# Patient Record
Sex: FEMALE | Race: WHITE
[De-identification: names, ages, dates, MRNs, and addresses within clinical notes are randomized per-mention and may not be internally consistent; named-entity substitution may affect disease eponyms.]

---

## 2018-07-19 ENCOUNTER — HOSPITAL ENCOUNTER (EMERGENCY)
Dept: HOSPITAL 74 - JERFT | Age: 58
Discharge: HOME | End: 2018-07-19
Payer: MEDICARE

## 2018-07-19 VITALS — BODY MASS INDEX: 33.2 KG/M2

## 2018-07-19 VITALS — SYSTOLIC BLOOD PRESSURE: 144 MMHG | DIASTOLIC BLOOD PRESSURE: 81 MMHG | TEMPERATURE: 99 F | HEART RATE: 77 BPM

## 2018-07-19 DIAGNOSIS — M96.1: ICD-10-CM

## 2018-07-19 DIAGNOSIS — J44.9: ICD-10-CM

## 2018-07-19 DIAGNOSIS — I10: ICD-10-CM

## 2018-07-19 DIAGNOSIS — Z87.19: ICD-10-CM

## 2018-07-19 DIAGNOSIS — Z95.1: ICD-10-CM

## 2018-07-19 DIAGNOSIS — I25.10: ICD-10-CM

## 2018-07-19 DIAGNOSIS — J45.909: ICD-10-CM

## 2018-07-19 DIAGNOSIS — R10.32: Primary | ICD-10-CM

## 2018-07-19 DIAGNOSIS — E78.00: ICD-10-CM

## 2018-07-19 NOTE — PDOC
Rapid Medical Evaluation


Medical Evaluation: 


 Allergies











Allergy/AdvReac Type Severity Reaction Status Date / Time


 


No Known Allergies Allergy   Verified 06/18/18 21:51











07/19/18 19:20


I have performed a brief in-person evaluation of this patient. 


The patient presents with a chief complaint of: patient reports "pain to L groin

" with movement x "months", "i need an x-ray because i want to know if it's my 

hip,  both my sisters had hip replacements and all they needed was a hip x-ray.

"  has seen gyn, pain mgmt, but not ortho for groin pain


Pertinent physical exam findings: well appearing, ambulatory


I have ordered the following:  x-ray hip/pelvis


The patient will proceed to the ED for further evaluation.





**Discharge Disposition





- Diagnosis


 Groin pain, chronic, left








- Referrals





- Patient Instructions





- Post Discharge Activity

## 2018-07-19 NOTE — PDOC
History of Present Illness





- General


Chief Complaint: Pain


Stated Complaint: PAIN


Time Seen by Provider: 07/19/18 19:35


History Source: Patient


Exam Limitations: No Limitations





- History of Present Illness


Initial Comments: 





07/19/18 20:26


58-year-old female presents to the emergency room for evaluation of left groin 

pain for the past 6 months worsened with movement. Patient states has not had 

any imaging of the above listed receivers injection for her pain management 

doctor to joint with minimal improvement. Patient states takes Percocet on 

daily basis and states pain has not been relieved. Patient states has history 

of low back pain herniated disc with sciatica. Patient denies abdominal pain, 

urinary or bowel complaints. 


Timing/Duration: getting worse, intermittent


Severity: moderate


Associated Symptoms: reports: denies symptoms





Past History





- Travel


Traveled outside of the country in the last 30 days: No





- Past Medical History


Allergies/Adverse Reactions: 


 Allergies











Allergy/AdvReac Type Severity Reaction Status Date / Time


 


No Known Allergies Allergy   Verified 07/19/18 19:23











Home Medications: 


Ambulatory Orders





Aspirin Coated [Ecotrin -] 81 mg PO DAILY 01/11/13 


Clopidogrel Bisulfate [Plavix -] 75 mg PO DAILY #1 01/30/13 


Cyclobenzaprine HCl [Flexeril -] 10 mg PO TID PRN 10/14/15 


Lisinopril [Prinivil] 10 mg PO DAILY #30 tablet 02/12/16 


Metoprolol Tartrate [Lopressor -] 50 mg PO BID #60 tablet 02/12/16 


Clonazepam [Klonopin] 1 mg PO ASDIR 12/15/16 


Oxycodone HCl/Acetaminophen [Percocet  mg Tablet] 1 each PO QID PRN 12/15/

16 


Trazodone HCl 100 mg PO DAILY PRN 12/15/16 


Chlorthalidone 25 mg PO ASDIR 07/19/18 








Anemia: No


Asthma: Yes


Cancer: No


Cardiac Disorders: Yes (prolonged QT, CAD)


CVA: No


COPD: Yes


CHF: No


Dementia: No


Diabetes: No


GI Disorders: Yes (Irritable bowel syndrome, GERD)


 Disorders: No


HTN: Yes


Hypercholesterolemia: Yes


Liver Disease: No


Seizures: No


Thyroid Disease: No





- Surgical History


Abdominal Surgery: No


Appendectomy: No


Cardiac Surgery: Yes (bypass x 3 2010)


Cholecystectomy: No


Lung Surgery: No


Neurologic Surgery: No


Orthopedic Surgery: Yes (laminectomy 2004; jeff in rt ankle 2011)





- Family Disease History


Family Disease History: CA: Father, Brother, Sister





- Immunization History


Immunization Up to Date: No (states bad rx to flu shot last year)





- Suicide/Smoking/Psychosocial Hx


Smoking Status: Yes


Smoking History: Former smoker


Years of Tobacco Use: 20


Have you smoked in the past 12 months: Yes


Number of Cigarettes Smoked Daily: 20


Information on smoking cessation initiated: No


'Breaking Loose' booklet given: 01/22/16


Hx Alcohol Use: No


Drug/Substance Use Hx: No


Substance Use Type: Cocaine


Hx Substance Use Treatment: No


Patient Lives Alone: No


Lives with/in: parents





**Review of Systems





- Review of Systems


Able to Perform ROS?: No


Constitutional: No: Symptoms Reported


Musculoskeletal: Yes: Joint Pain, Muscle Pain


Integumentary: No: Symptoms Reported


Neurological: No: Symptoms reported


Endocrine: No: Symptoms Reported


Hematologic/Lymphatic: No: Symptoms Reported





*Physical Exam





- Vital Signs


 Last Vital Signs











Temp Pulse Resp BP Pulse Ox


 


 99.0 F   77   18   144/81   96 


 


 07/19/18 19:24  07/19/18 19:24  07/19/18 19:24  07/19/18 19:24  07/19/18 19:24














- Physical Exam


General Appearance: Yes: Nourished, Appropriately Dressed.  No: Apparent 

Distress


Vascular Pulses: Femoral (L): 2+


Gastrointestinal/Abdominal: positive: Soft.  negative: Tenderness


Extremity: positive: Normal Capillary Refill, Normal Inspection, Tender (Over 

left inguinal fold/ ligament).  negative: Normal Range of Motion (discomfort 

noted with abduction and straight leg raise of left lower extremity)


Integumentary: positive: Normal Color, Warm, Moist.  negative: Swelling, 

Ecchymosis


Neurologic: positive: Motor Strength 5/5 (ambulatory)





Medical Decision Making





- Medical Decision Making





07/19/18 20:09


Patient with left groin pain for the past 6 months worsened with movement. 

Patient states pain is unrelieved with Percocet which she has been prescribed 

on a daily basis for back pain. Patient exam had left inguinal tenderness with 

no adenopathy edema or increased warmth. Patient tender over the inguinal 

ligament. The pt received an x-ray from rapid medical evaluation. Patient 

ordered for Toradol here and  recommended to follow up with her pain management/

primary care doctor to receive a referral for MRI to  assess for damage of the 

ligament/tendon of the left groin.


07/19/18 20:31


Negative for acute findings except for mild degenerative joint disease.


Patient given copy of her x-ray on disc





*DC/Admit/Observation/Transfer


Diagnosis at time of Disposition: 


 Groin pain, chronic, left, Left inguinal pain








- Discharge Dispostion


Disposition: HOME


Condition at time of disposition: Good





- Referrals





- Patient Instructions


Printed Discharge Instructions:  DI for Ligament Sprains


Additional Instructions: 


At this time I recommend following up with her doctor or pain management to 

expedite an MRI.


Otherwise continue taking her Percocet avoid movements that trigger discomfort 

and may apply heating pad also for minor relief





- Post Discharge Activity

## 2019-03-22 PROBLEM — Z00.00 ENCOUNTER FOR PREVENTIVE HEALTH EXAMINATION: Status: ACTIVE | Noted: 2019-03-22

## 2019-03-27 ENCOUNTER — APPOINTMENT (OUTPATIENT)
Dept: PAIN MANAGEMENT | Facility: CLINIC | Age: 59
End: 2019-03-27
Payer: MEDICARE

## 2019-03-27 VITALS
SYSTOLIC BLOOD PRESSURE: 128 MMHG | DIASTOLIC BLOOD PRESSURE: 70 MMHG | BODY MASS INDEX: 34.75 KG/M2 | HEIGHT: 60 IN | WEIGHT: 177 LBS

## 2019-03-27 DIAGNOSIS — M54.16 RADICULOPATHY, LUMBAR REGION: ICD-10-CM

## 2019-03-27 DIAGNOSIS — M25.552 PAIN IN LEFT HIP: ICD-10-CM

## 2019-03-27 DIAGNOSIS — Z87.39 PERSONAL HISTORY OF OTHER DISEASES OF THE MUSCULOSKELETAL SYSTEM AND CONNECTIVE TISSUE: ICD-10-CM

## 2019-03-27 DIAGNOSIS — Z86.79 PERSONAL HISTORY OF OTHER DISEASES OF THE CIRCULATORY SYSTEM: ICD-10-CM

## 2019-03-27 DIAGNOSIS — F11.20 OPIOID DEPENDENCE, UNCOMPLICATED: ICD-10-CM

## 2019-03-27 PROCEDURE — 99204 OFFICE O/P NEW MOD 45 MIN: CPT

## 2019-03-28 PROBLEM — F11.20 OPIOID DEPENDENCE: Status: ACTIVE | Noted: 2019-03-28

## 2019-03-28 PROBLEM — M25.552 LEFT HIP PAIN: Status: ACTIVE | Noted: 2019-03-28

## 2019-03-28 PROBLEM — Z87.39 HISTORY OF BACK PAIN: Status: RESOLVED | Noted: 2019-03-28 | Resolved: 2019-03-28

## 2019-03-28 PROBLEM — M54.16 LUMBAR RADICULOPATHY, CHRONIC: Status: ACTIVE | Noted: 2019-03-28

## 2019-03-28 PROBLEM — Z86.79 HISTORY OF ESSENTIAL HYPERTENSION: Status: RESOLVED | Noted: 2019-03-28 | Resolved: 2019-03-28

## 2019-03-28 NOTE — REVIEW OF SYSTEMS
[Decrease Hearing] : decrease hearing [Joint Pain] : joint pain [Anxiety] : anxiety [Depression] : depression [Negative] : Heme/Lymph [FreeTextEntry2] : weight gain [FreeTextEntry3] : vision disturbance [FreeTextEntry5] : sob, chest pain

## 2019-03-28 NOTE — HISTORY OF PRESENT ILLNESS
[___ yrs] : [unfilled] year(s) ago [10] : a maximum pain level of 10/10 [Sharp] : sharp [Shooting] : shooting [Sitting] : sitting [Standing] : standing [Walking] : walking [Medications] : medications [FreeTextEntry1] : 59-year-old female presents with left hip pain. She also has low back pain which radiates down the posterior aspect of the left lower extremity. She reports that she had lumbar spine surgery in 2007 which was beneficial. Her pain in her low back has been present for over 40 years. She has not had a recent MRI of her lumbar spine. Her right leg is within normal limits. She is scheduled to have a left total hip replacement on May 7 of this year. She sees Dr. Shanks for interventions and medication management. Seeking second opinion. Pain is 10 out of 10 in intensity. Pain is described as sharp and shooting. Papers medication. The pain is worse activity. Quality of life is significantly impaired. No other recent changes in health.

## 2019-03-28 NOTE — ASSESSMENT
[FreeTextEntry1] : 59-year-old female presents with left hip pain and low back pain which radiates down the posterior aspect of the left lower extremity. She is scheduled to have a left total hip replacement on May 7 of this year. She sees Dr. Shanks for interventions and medication management. I would advise against opiate medications for long-term management of pain, and would advise the patient on a multimodal approach to her pain. Advised the patient she would likely benefit from an MRI of the lumbar spine. Advised the patient if she wishes further opinion I would be happy to see her to review imaging and plan for potential intervention. Would delay intervention to left her hip surgery. Patient advised that the administration of steroids will likely raise the blood pressure transiently, and to monitor this closely following any interventional procedure that involves steroids, given history of hypertension.\par

## 2019-03-28 NOTE — PHYSICAL EXAM
[de-identified] : Constitutional: Well-developed, in no acute distress\par Eyes: Pupil- Right: normal, Left: normal\par Neck exam: Inspection normal\par Respiratory: Normal effort, speaking in full sentences\par Cardiovascular: Regular rate and rhythm\par Vascular: Dorsal pedis pulses normal and equal bilaterally\par Abdomen: Inspection normal, no distension\par Skin: Inspection normal, no bruising noted\par Musculoskeletal:\par Lumbar Spine:   Gait: Antalgic\par 		Inspection: Normal curvature, no abnormal kyphosis or scoliosis\par 		Facet loading: pain bilaterally\par 		Palpation:\par 			Lumbar and paraspinal muscles: pain bilaterally\par 			Sacroiliac joint: no pain\par 			Greater trochanter: no pain\par 		Muscle Strength:\par 		Iliopsoas: 5/5 bilaterally\par 		Quadriceps: 5/5 bilaterally\par 		Hamstrings: 5/5 bilaterally\par 		Tibialis anterior: 5/5 bilaterally\par 		Extensor hallucis longus: 5/5 bilaterally\par \par 		Sensation: normal and equal in bilateral lower extremities\par \par 		Reflexes:\par 			Patellar reflex: 2+ bilaterally\par 			Ankle jerk reflex: 2+ bilaterally\par 		\par 		Straight leg raise: negative bilaterally\par \par Extremity: no edema noted\par Neurological: Memory normal, AAO x 3, Cranial nerves II - XII grossly normal\par Psychiatric: Appropriate mood and affect, oriented to time, place, person, and situation\par \par \par

## 2019-07-06 ENCOUNTER — HOSPITAL ENCOUNTER (EMERGENCY)
Dept: HOSPITAL 74 - JER | Age: 59
Discharge: HOME | End: 2019-07-06
Payer: MEDICARE

## 2019-07-06 VITALS — TEMPERATURE: 98.4 F

## 2019-07-06 VITALS — SYSTOLIC BLOOD PRESSURE: 100 MMHG | DIASTOLIC BLOOD PRESSURE: 60 MMHG | HEART RATE: 70 BPM

## 2019-07-06 VITALS — BODY MASS INDEX: 27.3 KG/M2

## 2019-07-06 DIAGNOSIS — J44.9: ICD-10-CM

## 2019-07-06 DIAGNOSIS — Z95.1: ICD-10-CM

## 2019-07-06 DIAGNOSIS — I25.2: ICD-10-CM

## 2019-07-06 DIAGNOSIS — K57.92: Primary | ICD-10-CM

## 2019-07-06 DIAGNOSIS — I25.10: ICD-10-CM

## 2019-07-06 DIAGNOSIS — I10: ICD-10-CM

## 2019-07-06 DIAGNOSIS — K21.9: ICD-10-CM

## 2019-07-06 LAB
ALBUMIN SERPL-MCNC: 3.6 G/DL (ref 3.4–5)
ALP SERPL-CCNC: 95 U/L (ref 45–117)
ALT SERPL-CCNC: 30 U/L (ref 13–61)
ANION GAP SERPL CALC-SCNC: 5 MMOL/L (ref 8–16)
APPEARANCE UR: CLEAR
AST SERPL-CCNC: 9 U/L (ref 15–37)
BASOPHILS # BLD: 0.6 % (ref 0–2)
BILIRUB SERPL-MCNC: 0.4 MG/DL (ref 0.2–1)
BILIRUB UR STRIP.AUTO-MCNC: NEGATIVE MG/DL
BUN SERPL-MCNC: 18.9 MG/DL (ref 7–18)
CALCIUM SERPL-MCNC: 8.9 MG/DL (ref 8.5–10.1)
CHLORIDE SERPL-SCNC: 102 MMOL/L (ref 98–107)
CO2 SERPL-SCNC: 31 MMOL/L (ref 21–32)
COLOR UR: (no result)
CREAT SERPL-MCNC: 0.8 MG/DL (ref 0.55–1.3)
DEPRECATED RDW RBC AUTO: 13.8 % (ref 11.6–15.6)
EOSINOPHIL # BLD: 0.9 % (ref 0–4.5)
GLUCOSE SERPL-MCNC: 99 MG/DL (ref 74–106)
HCT VFR BLD CALC: 43.9 % (ref 32.4–45.2)
HGB BLD-MCNC: 14.2 GM/DL (ref 10.7–15.3)
KETONES UR QL STRIP: (no result)
LEUKOCYTE ESTERASE UR QL STRIP.AUTO: NEGATIVE
LYMPHOCYTES # BLD: 10.4 % (ref 8–40)
MAGNESIUM SERPL-MCNC: 1.9 MG/DL (ref 1.8–2.4)
MCH RBC QN AUTO: 29.5 PG (ref 25.7–33.7)
MCHC RBC AUTO-ENTMCNC: 32.4 G/DL (ref 32–36)
MCV RBC: 91 FL (ref 80–96)
MONOCYTES # BLD AUTO: 11.3 % (ref 3.8–10.2)
NEUTROPHILS # BLD: 76.8 % (ref 42.8–82.8)
NITRITE UR QL STRIP: NEGATIVE
PH UR: 5 [PH] (ref 5–8)
PLATELET # BLD AUTO: 238 K/MM3 (ref 134–434)
PMV BLD: 9.6 FL (ref 7.5–11.1)
POTASSIUM SERPLBLD-SCNC: 3.9 MMOL/L (ref 3.5–5.1)
PROT SERPL-MCNC: 6.8 G/DL (ref 6.4–8.2)
PROT UR QL STRIP: NEGATIVE
PROT UR QL STRIP: NEGATIVE
RBC # BLD AUTO: 4.83 M/MM3 (ref 3.6–5.2)
SODIUM SERPL-SCNC: 138 MMOL/L (ref 136–145)
SP GR UR: 1.03 (ref 1.01–1.03)
UROBILINOGEN UR STRIP-MCNC: 1 MG/DL (ref 0.2–1)
WBC # BLD AUTO: 13.5 K/MM3 (ref 4–10)

## 2019-07-06 PROCEDURE — 3E033NZ INTRODUCTION OF ANALGESICS, HYPNOTICS, SEDATIVES INTO PERIPHERAL VEIN, PERCUTANEOUS APPROACH: ICD-10-PCS

## 2019-07-06 NOTE — PDOC
Documentation entered by Selma Duncan SCRIBE, acting as scribe for Marcellus Khalil MD.








Marcellus Khalil MD:  This documentation has been prepared by the [place] latif Collisia, SCRIBE, under my direction and personally reviewed by me in its 

entirety.  I confirm that the documentation accurately reflects all work, 

treatment, procedures, and medical decision making performed by me.  





Attending Attestation





- Resident


Resident Name: AliciaKarla





- ED Attending Attestation


I have performed the following: I have examined & evaluated the patient, The 

case was reviewed & discussed with the resident, I agree w/resident's findings 

& plan, Exceptions are as noted





- HPI


HPI: 





07/06/19 17:52


The patient is a 59 year old female with a significant past medical history of 

CAD, COPD, GERD, hypertension, hypercholesterolemia, diverticulitis, and 3 

vessel CABG who presents to the emergency department with 4 days of 

constipation and left sided abdominal pain. Symptoms are associated with nausea 

with 2 episodes of vomiting with heartburn over the last week. She states that 

she has been passing gas but has not had a BM in 4 days. She states this feels 

like her previous diverticulitis for which she was admitted to [facility] on IV abx a few years ago. 





Denies fevers, chills, cp, sob, LE edema, ha, focal weakness/numbness.





- Physicial Exam


PE: 





07/06/19 19:06


agree with resident exam





- Medical Decision Making





07/06/19 19:07


58yo F presents to the ED with L sided abd pain a/w nausea, vomiting, 

constipation.


Vitals wnl


Exam with LLQ ttp


DDx includes diverticulitis vs colitis vs constipation vs appendicitis vs 

gastritis


Plan:


labs


EKG (given nausea,vomiting) 


UA


CTAP


reassess





07/06/19 21:07


CTAP with uncomplicated diverticulitis


Pt continues to be well appearing, tolerating PO, non toxic


Pt given cipro/flagyl PO first dose here


Prescription sent to [facility], pt to  upon DC


Pt also had lung nodules on CT that must be followed up with outpt CT


Results communicated with pt, including improtance of f/u on these nodules to 

make sure they are not cancerous. Pt is active smoker and thus higher risk


Pt expresses understanding, will take report to PMD 


Strict return precautions discussed





I discussed the physical exam findings, ancillary test results and final 

diagnoses with the patient. I answered all of the patient's questions. The 

patient was satisfied with the care received and felt comfortable with the 

discharge plan and treatment plan. The patient will call their primary care 

physician within 24 hours to arrange follow-up and will return to the Emergency 

Department with any new, persistent or worsening symptoms. 








**Heart Score/ECG Review


  ** #1





07/06/19 21:09


Twelve-lead EKG was performed and reviewed by me. Normal sinus rhythm, rate 85. 

Normal axis and intervals. No ST elevations.Diffuse T-wave changes however no 

significant change compared to EKG from June 2018.

## 2019-07-06 NOTE — PDOC
History of Present Illness





- General


Chief Complaint: Pain


Stated Complaint: ABD PAIN


Time Seen by Provider: 07/06/19 17:20





- History of Present Illness


Initial Comments: 


Cristal Altamirano is a 58yo woman with a PMH of CAD s/p MI s/p 3vCABG, COPD, 

current smoker, h/o diverticulitis, IBS who presents stating that she is "here 

for diverticulitis." She states that she has had left-sided abdominal pain for 

several days along with constipation, which is very unusual for her. She has 

not had a BM in 3 days though she usually has 2-3 per day. She is still passing 

flatus. She has been able to tolerate food but has not had much of an appetite 

and has not tried to eat much. She has had frequent nausea. Ms Altamirano also 

reports two episodes over the past week in which she felt nauseated, had mid-

sternal burning that felt "like heartburn" followed by vomiting. The burning 

pain subsided immediately after vomiting. Currently she has nausea along with 

the abdominal pain. She did not take any medication at home for the pain as she 

did not want to "mask" the symptoms prior to presenting to the ED. She does 

usually take Percocet for arthritis pain in her hip and back, but she has not 

taken any recently. 








Past History





- Past Medical History


Allergies/Adverse Reactions: 


 Allergies











Allergy/AdvReac Type Severity Reaction Status Date / Time


 


No Known Allergies Allergy   Verified 07/06/19 16:33











Home Medications: 


Ambulatory Orders





Aspirin Coated [Ecotrin -] 81 mg PO DAILY 01/11/13 


Clopidogrel Bisulfate [Plavix -] 75 mg PO DAILY #1 01/30/13 


Lisinopril [Prinivil] 10 mg PO DAILY #30 tablet 02/12/16 


Metoprolol Tartrate [Lopressor -] 50 mg PO BID #60 tablet 02/12/16 


Clonazepam [Klonopin] 1 mg PO ASDIR 12/15/16 


Oxycodone HCl/Acetaminophen [Percocet  mg Tablet] 1 each PO QID PRN 12/15/

16 


Chlorthalidone 25 mg PO ASDIR 07/19/18 


Ciprofloxacin [Cipro -] 500 mg PO Q12H #20 tablet 07/06/19 


Famotidine [Pepcid] 20 mg PO ASDIR #30 tablet 07/06/19 


Metronidazole 500 mg PO Q8H #30 tablet 07/06/19 








Anemia: No


Asthma: Yes


Cancer: No


Cardiac Disorders: Yes (prolonged QT, CAD)


CVA: No


COPD: Yes


CHF: No


Dementia: No


Diabetes: No


GI Disorders: Yes (Irritable bowel syndrome, GERD)


 Disorders: No


HTN: Yes


Hypercholesterolemia: Yes


Liver Disease: No


Seizures: No


Thyroid Disease: No





- Surgical History


Abdominal Surgery: No


Appendectomy: No


Cardiac Surgery: Yes (bypass x 3 2010)


Cholecystectomy: No


Lung Surgery: No


Neurologic Surgery: No


Orthopedic Surgery: Yes (laminectomy 2004; jeff in rt ankle 2011)





- Family Disease History


Family Disease History: CA: Father, Brother, Sister





- Immunization History


Immunization Up to Date: No (states bad rx to flu shot last year)





- Suicide/Smoking/Psychosocial Hx


Smoking Status: Yes


Smoking History: Current every day smoker


Years of Tobacco Use: 20


Have you smoked in the past 12 months: Yes


Number of Cigarettes Smoked Daily: 20


Information on smoking cessation initiated: No


'Breaking Loose' booklet given: 01/22/16


Hx Alcohol Use: No


Drug/Substance Use Hx: No


Substance Use Type: Cocaine


Hx Substance Use Treatment: No





**Review of Systems





- Review of Systems


Comments:: 


General: No fevers, no chills, no weight or appetite change, no malaise


HEENT: No changes in vision, no changes in hearing, no congestion, no sore 

throat


CV: No chest pain, no palpitations, no LE edema


Pulm: No SOB, no cough, no wheezing


GI: See HPI


: No frequency, no urgency, no dysuria


Musc: No back pain, no joint swelling, no recent injury


Skin: No rash, no lesions, no erythema


Endo: No excessive thirst, no heat/cold intolerance


Heme: No unusual bruising or bleeding, no swollen glands


Neuro: No syncope, no numbness/tingling, no focal weakness


Vasc: No claudication


Psych: No recent change in mood, no SI or HI











*Physical Exam





- Vital Signs


 Last Vital Signs











Temp Pulse Resp BP Pulse Ox


 


 98.4 F   72   18   101/61   99 


 


 07/06/19 16:31  07/06/19 16:31  07/06/19 16:31  07/06/19 16:31  07/06/19 16:31














- Physical Exam


Comments: 


General: In no acute distress


HEENT: PERRL, EOMI, MMM, voice normal, normal neck ROM 


Cards: RRR, no murmur appreciated


Pulm: Comfortable on room air, clear to auscultation bilaterally


Abd: Soft, TTP in LLQ. Mildly distended.


Ext: Atraumatic. No LE edema. ROM intact 


Vasc: Extremities WWP 


Skin: Normal color, no rashes or lesions


Neuro: A&Ox3, CN grossly intact, normal speech, motor/sensory grossly intact 

and symmetric


Psych: Mood appropriate to situation 











ED Treatment Course





- LABORATORY


CBC & Chemistry Diagram: 


 07/06/19 17:28





 07/06/19 17:28





- RADIOLOGY


Radiology Studies Ordered: 














 Category Date Time Status


 


 ABDOMEN & PELVIS CT WITH CONTR [CT] Stat CT Scan  07/06/19 17:12 Ordered


 


 CHEST PA & LAT [RAD] Stat Radiology  07/06/19 17:12 Ordered














Medical Decision Making





- Medical Decision Making





07/06/19 17:29


Cristal Altamirano is a 58yo woman with a PMH of CAD s/p MI s/p 3vCABG, COPD, 

current smoker, h/o diverticulitis, IBS who presents with 3-4 days of left 

abdominal pain, constipation, nausea, and 2x episodes of vomiting.





- Ddx includes diverticulitis, gastroenteritis, IBS flare, constipation. Less 

likely appendicitis, simple gastritis, pancreatitis, cholecystitis or other 

intra-abdominal pathology given locatin of pain. No urinary symptoms suggesting 

UTI


- CBC, CMP, UA, CT abd/pelvis, CXR, EKG, trop


- IVF, famotidine, zofran, acetaminophen





07/06/19 18:17


- Labs notable for leukocytosis to 13. No other concerning abnormalities


- UA negative


- Re-assessed. States she feels somewhat improved. Updated w/ results





07/06/19 19:46


- CT completed, sent to imaging on call for read


- CXR without focal consolidation or other pathology. Sternal wires visualized, 

large cardiac silhouette 





07/06/19 21:01


- CT indicates diverticulitis


- Pt updated. Feels ready to go home


- Advised regarding liquid diet, pain control, follow up, return precautions. 

Ms Altamirano states understanding of the plan. Will give contact information for 

GI follow up.


- Will give first dose of abx prior to discharge








Discussed with Dr Khalil.





Karla Vargas


PGY2





*DC/Admit/Observation/Transfer


Diagnosis at time of Disposition: 


 Diverticulitis








- Discharge Dispostion


Disposition: HOME


Condition at time of disposition: Stable


Decision to Admit order: No





- Prescriptions


Prescriptions: 


Ciprofloxacin [Cipro -] 500 mg PO Q12H #20 tablet


Famotidine [Pepcid] 20 mg PO ASDIR #30 tablet


Metronidazole 500 mg PO Q8H #30 tablet





- Referrals


Referrals: 


Donte Rolle MD [Primary Care Provider] - 


Ariel Souza MD [Staff Physician] - 





- Patient Instructions


Printed Discharge Instructions:  DI for Diverticulitis


Additional Instructions: 


Discharge Instructions:


You were seen in the emergency department for abdominal pain. Your CT scan 

shows that you have diverticulitis, which is an infection of the colon. 


Diverticulosis is the presence of pouches on the colon. Diverticulitis is an 

infection of these pouches. 


Your CT scan also showed that you have several nodules in the lower left lung. 

It is impossible to determine what these nodules are without additional 

information. It is strongly recommended that you follow up with your regular 

pulmonologist regarding the nodules as you may require additional testing. 





Home Care and Follow Up:


- Stick to a liquid diet for the next 48-72 hours. This includes water, sports 

drinks, soup, jello, oatmeal, etc.


- You have been prescribed several antibiotics for the diverticulitis. These 

should be taken as directed for 10 days. One is called ciprofloxacin, which is 

taken every 12 hours (e.g. 6am and 6pm). The other is metronidazole (Flagyl) 

which is taken every 8 hours (e.g. 6am, 2pm, 10pm). 


- Do not drink ANY alcohol while taking metronidazole as this will make you 

feel very sick


- You have also been prescribed famotidine (Pepcid) for nausea and heartburn. 

This can be taken once or twice per day while you are sick, and you may wish to 

take it daily once you are feeling better. It is recommended that you take 

Pepcid 30 minutes before other food or medications. 


- You may use over the counter medications as needed for pain at home. 650mg 

acetaminophen (Tylenol) or 600mg ibuprofen (Motrin or Advil) can be used every 6

-8 hours. If needed for continued pain, these medications may be alternated 

every 3-4 hours. For example, if you take ibuprofen at 9am, you may take 

acetaminophen at noon, ibuprofen at 3pm, etc. 


- Do not take more than 4000mg (4g) of acetaminophen total per 24 hours. Be 

aware that your home Percocet includes acetaminophen. 


- It is strongly recommended that you take ibuprofen with food to help prevent 

stomach irritation. 


- Please make an appointment to see a gastroenterologist for follow up. You 

have been given contact information for Dr Souza, but you may see any GI 

doctor that you prefer. You should be seen within 1-2 weeks of completing your 

antibiotics.


- Make an appointment to see your regular doctor within the next week for 

follow up.


- You should see pulmonology for follow up of the lung nodules as soon as 

possible. 


- Seek immediate medical care if you have significant worsening of your symptoms

, you are unable to tolerate liquids, you become dehydrated, you develop fever 

to 101F or higher, you have sudden onset of severe abdominal pain, you have 

rectal bleeding, you have persistent vomiting or see blood in your vomit, or if 

you have any other medical emergency. 








- Post Discharge Activity

## 2019-07-07 NOTE — EKG
Test Reason : 

Blood Pressure : ***/*** mmHG

Vent. Rate : 085 BPM     Atrial Rate : 085 BPM

   P-R Int : 180 ms          QRS Dur : 088 ms

    QT Int : 370 ms       P-R-T Axes : 062 043 078 degrees

   QTc Int : 440 ms

 

NORMAL SINUS RHYTHM

LEFT ATRIAL ENLARGEMENT

CANNOT RULE OUT INFERIOR INFARCT (CITED ON OR BEFORE 06-JUL-2019)

ABNORMAL ECG

WHEN COMPARED WITH ECG OF 18-JUN-2018 22:04,

NO SIGNIFICANT CHANGE WAS FOUND

Confirmed by MD PAULINO MOYSES ([id number]) on 7/7/2019 1:06:01 PM

 

Referred By:             Confirmed By:JOSE G PAULINO MD

## 2019-10-16 ENCOUNTER — HOSPITAL ENCOUNTER (EMERGENCY)
Dept: HOSPITAL 74 - JERFT | Age: 59
Discharge: HOME | End: 2019-10-16
Payer: COMMERCIAL

## 2019-10-16 VITALS — HEART RATE: 79 BPM | TEMPERATURE: 98.2 F | SYSTOLIC BLOOD PRESSURE: 150 MMHG | DIASTOLIC BLOOD PRESSURE: 61 MMHG

## 2019-10-16 VITALS — BODY MASS INDEX: 33.2 KG/M2

## 2019-10-16 DIAGNOSIS — J44.9: ICD-10-CM

## 2019-10-16 DIAGNOSIS — J45.909: ICD-10-CM

## 2019-10-16 DIAGNOSIS — M25.552: ICD-10-CM

## 2019-10-16 DIAGNOSIS — M25.562: Primary | ICD-10-CM

## 2019-10-16 DIAGNOSIS — F17.210: ICD-10-CM
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
## 2019-10-16 DIAGNOSIS — E78.00: ICD-10-CM

## 2019-10-16 DIAGNOSIS — K21.9: ICD-10-CM

## 2019-10-16 DIAGNOSIS — I25.10: ICD-10-CM
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
## 2019-10-16 DIAGNOSIS — I51.9: ICD-10-CM

## 2019-10-16 DIAGNOSIS — I10: ICD-10-CM

## 2019-10-16 DIAGNOSIS — K92.9: ICD-10-CM

## 2019-10-16 DIAGNOSIS — K58.9: ICD-10-CM
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
## 2019-10-16 DIAGNOSIS — Z95.1: ICD-10-CM
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
## 2019-11-13 ENCOUNTER — HOSPITAL ENCOUNTER (EMERGENCY)
Dept: HOSPITAL 74 - JER | Age: 59
Discharge: HOME | End: 2019-11-13
Payer: COMMERCIAL

## 2019-11-13 VITALS — DIASTOLIC BLOOD PRESSURE: 71 MMHG | SYSTOLIC BLOOD PRESSURE: 129 MMHG | HEART RATE: 72 BPM

## 2019-11-13 VITALS — BODY MASS INDEX: 32.2 KG/M2

## 2019-11-13 VITALS — TEMPERATURE: 98 F

## 2019-11-13 DIAGNOSIS — F17.210: ICD-10-CM

## 2019-11-13 DIAGNOSIS — R07.9: ICD-10-CM

## 2019-11-13 DIAGNOSIS — Z95.1: ICD-10-CM

## 2019-11-13 DIAGNOSIS — G89.29: ICD-10-CM

## 2019-11-13 DIAGNOSIS — J44.9: ICD-10-CM
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
## 2019-11-13 DIAGNOSIS — I25.2: ICD-10-CM
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
## 2019-11-13 LAB
ALBUMIN SERPL-MCNC: 3.4 G/DL (ref 3.4–5)
ALP SERPL-CCNC: 92 U/L (ref 45–117)
ALT SERPL-CCNC: 20 U/L (ref 13–61)
ANION GAP SERPL CALC-SCNC: 5 MMOL/L (ref 8–16)
AST SERPL-CCNC: 12 U/L (ref 15–37)
BASOPHILS # BLD: 1.2 % (ref 0–2)
BILIRUB SERPL-MCNC: 0.2 MG/DL (ref 0.2–1)
BUN SERPL-MCNC: 14.4 MG/DL (ref 7–18)
CALCIUM SERPL-MCNC: 9.1 MG/DL (ref 8.5–10.1)
CHLORIDE SERPL-SCNC: 105 MMOL/L (ref 98–107)
CO2 SERPL-SCNC: 30 MMOL/L (ref 21–32)
CREAT SERPL-MCNC: 0.6 MG/DL (ref 0.55–1.3)
DEPRECATED RDW RBC AUTO: 14 % (ref 11.6–15.6)
EOSINOPHIL # BLD: 1.5 % (ref 0–4.5)
GLUCOSE SERPL-MCNC: 71 MG/DL (ref 74–106)
HCT VFR BLD CALC: 41.4 % (ref 32.4–45.2)
HGB BLD-MCNC: 13.4 GM/DL (ref 10.7–15.3)
LYMPHOCYTES # BLD: 25.9 % (ref 8–40)
MCH RBC QN AUTO: 29.5 PG (ref 25.7–33.7)
MCHC RBC AUTO-ENTMCNC: 32.4 G/DL (ref 32–36)
MCV RBC: 91.2 FL (ref 80–96)
MONOCYTES # BLD AUTO: 8.6 % (ref 3.8–10.2)
NEUTROPHILS # BLD: 62.8 % (ref 42.8–82.8)
PLATELET # BLD AUTO: 220 K/MM3 (ref 134–434)
PMV BLD: 9.7 FL (ref 7.5–11.1)
POTASSIUM SERPLBLD-SCNC: 4 MMOL/L (ref 3.5–5.1)
PROT SERPL-MCNC: 6.2 G/DL (ref 6.4–8.2)
RBC # BLD AUTO: 4.55 M/MM3 (ref 3.6–5.2)
SODIUM SERPL-SCNC: 141 MMOL/L (ref 136–145)
WBC # BLD AUTO: 9.3 K/MM3 (ref 4–10)

## 2019-11-13 PROCEDURE — 3E033NZ INTRODUCTION OF ANALGESICS, HYPNOTICS, SEDATIVES INTO PERIPHERAL VEIN, PERCUTANEOUS APPROACH: ICD-10-PCS
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
## 2019-11-14 NOTE — EKG
Test Reason : 

Blood Pressure : ***/*** mmHG

Vent. Rate : 076 BPM     Atrial Rate : 076 BPM

   P-R Int : 172 ms          QRS Dur : 088 ms

    QT Int : 404 ms       P-R-T Axes : 051 073 086 degrees

   QTc Int : 454 ms

 

NORMAL SINUS RHYTHM

NORMAL ECG

WHEN COMPARED WITH ECG OF 06-JUL-2019 16:32,

MINIMAL CRITERIA FOR INFERIOR INFARCT ARE NO LONGER PRESENT

Confirmed by BIENVENIDO KIM, JENNIFER ([id number]) on 11/14/2019 12:39:11 PM

 

Referred By:             Confirmed By:JENNIFER FARIA MD

## 2021-10-01 PROBLEM — Z00.00 ENCOUNTER FOR PREVENTIVE HEALTH EXAMINATION: Noted: 2021-10-01

## 2021-10-05 ENCOUNTER — APPOINTMENT (OUTPATIENT)
Dept: SURGERY | Facility: CLINIC | Age: 61
End: 2021-10-05
Payer: COMMERCIAL

## 2021-10-05 VITALS
HEIGHT: 60 IN | BODY MASS INDEX: 36.91 KG/M2 | TEMPERATURE: 97.4 F | SYSTOLIC BLOOD PRESSURE: 151 MMHG | DIASTOLIC BLOOD PRESSURE: 96 MMHG | HEART RATE: 77 BPM | WEIGHT: 188 LBS

## 2021-10-05 DIAGNOSIS — Z86.69 PERSONAL HISTORY OF OTHER DISEASES OF THE NERVOUS SYSTEM AND SENSE ORGANS: ICD-10-CM

## 2021-10-05 DIAGNOSIS — Z86.39 PERSONAL HISTORY OF OTHER ENDOCRINE, NUTRITIONAL AND METABOLIC DISEASE: ICD-10-CM

## 2021-10-05 DIAGNOSIS — Z72.0 TOBACCO USE: ICD-10-CM

## 2021-10-05 DIAGNOSIS — Z86.79 PERSONAL HISTORY OF OTHER DISEASES OF THE CIRCULATORY SYSTEM: ICD-10-CM

## 2021-10-05 DIAGNOSIS — Z87.19 PERSONAL HISTORY OF OTHER DISEASES OF THE DIGESTIVE SYSTEM: ICD-10-CM

## 2021-10-05 DIAGNOSIS — Z87.09 PERSONAL HISTORY OF OTHER DISEASES OF THE RESPIRATORY SYSTEM: ICD-10-CM

## 2021-10-05 DIAGNOSIS — K44.9 DIAPHRAGMATIC HERNIA W/OUT OBSTRUCTION OR GANGRENE: ICD-10-CM

## 2021-10-05 DIAGNOSIS — Z15.89 GENETIC SUSCEPTIBILITY TO OTHER DISEASE: ICD-10-CM

## 2021-10-05 PROCEDURE — 99204 OFFICE O/P NEW MOD 45 MIN: CPT

## 2021-10-05 NOTE — HISTORY OF PRESENT ILLNESS
[de-identified] : 61 yr old female with complicated medical surgical hx presents with worsening GERD.  She has heartburn, dysphagia with almost all foods, constant coughing (though she also smokes 1/2 PPD x 30 years), has a tickle and easily gags.  She did have an EGD last year in 2020 that showed a small hiatal hernia.  This was done by Dr. Sharmila Nguyen.  He placed her on Nexium 40 mg daily but she states Dr. Deng recently switched her to Pepcid 30 mg BID.  At the time of her EGD biopsy for HPylori were negative and there was no evidence of esophagitis. Her medical hx is quite complex and includes a recent diagnosis of JANNY, a history of laryngeal polyps removed, a clotting disorder known as MTHR (she does not have a hematologist) for which her dad  at the age of 39.  She has had an MI due to this in  and underwent triple bypass surgery.  Recently in 2021 she was having chest pain and angiogram revealed blockages requring 2 additional stents. She is currently on Plavix and ASA.  She then was hospitalized in 2021 for Covid.  She is now fully vaccinated however.  She also has a myriad of joint problems - including a recent L THR due to OA, and has persistent right hip pain, and right rotator cuff issues from an accident.  She has had L5/S1 back surgery and right ankle surgery in the past. 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  She has severe SOB and is only able to walk 1/2 block on a flat surface before needing to rest.  She is not currently on home oxygen.

## 2021-10-05 NOTE — PHYSICAL EXAM
[Respiratory Effort] : normal respiratory effort [de-identified] : NAD [de-identified] : normal [de-identified] : hoarsness of her voice [de-identified] : at rest [de-identified] : soft, some tenderness on palpation of epigastrum. chest has midline sternotomy scar.

## 2021-10-05 NOTE — REASON FOR VISIT
[Initial Eval - Existing Diagnosis] : an initial evaluation of an existing diagnosis [FreeTextEntry1] : hiatal hernia recently diagnosed on EGD 10/15/2020

## 2021-10-05 NOTE — ASSESSMENT
[FreeTextEntry1] : Hiatal hernia seen 1 yr ago on EGD, now with worsening symptoms on Pepcid BID.  \par

## 2021-10-05 NOTE — CONSULT LETTER
[Dear  ___] : Dear  [unfilled], [Consult Letter:] : I had the pleasure of evaluating your patient, [unfilled]. [Please see my note below.] : Please see my note below. [Consult Closing:] : Thank you very much for allowing me to participate in the care of this patient.  If you have any questions, please do not hesitate to contact me. [Sincerely,] : Sincerely, [FreeTextEntry3] : Indira Stanford MD FACS\par Acute Care and General Surgery\par [facility]\par \par Office phone: [phone]\par Cell phone:  [phone]\par email:  [email or web address]

## 2021-10-05 NOTE — PLAN
[FreeTextEntry1] : Will get esophagram with UGI to eval for enlarging hernia and will likely need repeat EGD.  \par Pt is very high risk surgical candidate whose symptoms may be better managed with PPI.  \par Will base further treatment on UGI results.

## 2021-10-06 ENCOUNTER — RESULT REVIEW (OUTPATIENT)
Age: 61
End: 2021-10-06

## 2021-11-02 ENCOUNTER — APPOINTMENT (OUTPATIENT)
Dept: SURGERY | Facility: CLINIC | Age: 61
End: 2021-11-02
Payer: MEDICARE

## 2021-11-02 VITALS
HEART RATE: 79 BPM | SYSTOLIC BLOOD PRESSURE: 171 MMHG | HEIGHT: 60 IN | DIASTOLIC BLOOD PRESSURE: 96 MMHG | OXYGEN SATURATION: 95 % | BODY MASS INDEX: 36.91 KG/M2 | WEIGHT: 188 LBS | TEMPERATURE: 97.6 F

## 2021-11-02 DIAGNOSIS — K22.4 DYSKINESIA OF ESOPHAGUS: ICD-10-CM

## 2021-11-02 PROCEDURE — 99213 OFFICE O/P EST LOW 20 MIN: CPT

## 2021-11-02 NOTE — DATA REVIEWED
[FreeTextEntry1] : Esophagram from 10/20/21 reviewed.  Consistent w small hiatal hernia and spontaneous reflux noted reaching upper third of esophagus . Also noted was distal 2/3 of esophagus had non-propulsive contractions. No stricture seen.

## 2021-11-02 NOTE — PLAN
[FreeTextEntry1] : Refer for esophageal manometry and pH monitoring with Dr. Korey Bermudez at [facility].  Given the small size of the hernia and the dysmotility issue - a Nissen fundoplication would most likely exacerbate pt's symptoms of swallowing difficulty and dysphagia.  May need more aggressive PPI therapy for GERD and repeat endoscopy as well, since last scope was a year ago.  No surgical intervention at this point.

## 2021-11-02 NOTE — CONSULT LETTER
[Dear  ___] : Dear  [unfilled], [Referral Letter:] : I am referring [unfilled] to you for further evaluation.  My most recent evaluation follows. [Please see my note below.] : Please see my note below. [Referral Closing:] : Thank you very much for seeing this patient.  If you have any questions, please do not hesitate to contact me. [Sincerely,] : Sincerely, [FreeTextEntry3] : Indira Stanford MD FACS\par Acute Care and General Surgery\par [facility]\par \par Office phone: [phone]\par Cell phone:  [phone]\par email:  [email or web address]

## 2021-11-02 NOTE — HISTORY OF PRESENT ILLNESS
[de-identified] : 61 yr old here to discuss results from esophagram 10/20/21.  She is still having lots of heartburn despite the Pepcid, as well as dysphagia and food sticking.  She recently notes very loose stool as well.